# Patient Record
Sex: MALE | Race: WHITE | Employment: OTHER | URBAN - METROPOLITAN AREA
[De-identification: names, ages, dates, MRNs, and addresses within clinical notes are randomized per-mention and may not be internally consistent; named-entity substitution may affect disease eponyms.]

---

## 2023-11-09 ENCOUNTER — ANESTHESIA (OUTPATIENT)
Facility: HOSPITAL | Age: 73
End: 2023-11-09

## 2023-11-09 ENCOUNTER — ANESTHESIA EVENT (OUTPATIENT)
Facility: HOSPITAL | Age: 73
End: 2023-11-09

## 2023-11-09 ENCOUNTER — HOSPITAL ENCOUNTER (OUTPATIENT)
Facility: HOSPITAL | Age: 73
Setting detail: OUTPATIENT SURGERY
Discharge: HOME | End: 2023-11-09
Attending: INTERNAL MEDICINE | Admitting: INTERNAL MEDICINE
Payer: MEDICARE

## 2023-11-09 VITALS
TEMPERATURE: 97.8 F | OXYGEN SATURATION: 98 % | HEIGHT: 69 IN | HEART RATE: 56 BPM | WEIGHT: 217.59 LBS | DIASTOLIC BLOOD PRESSURE: 85 MMHG | RESPIRATION RATE: 20 BRPM | SYSTOLIC BLOOD PRESSURE: 118 MMHG | BODY MASS INDEX: 32.23 KG/M2

## 2023-11-09 PROCEDURE — 7100000011 HC PHASE II RECOVERY - ADDTL 15 MIN: Performed by: INTERNAL MEDICINE

## 2023-11-09 PROCEDURE — 6360000002 HC RX W HCPCS: Performed by: NURSE ANESTHETIST, CERTIFIED REGISTERED

## 2023-11-09 PROCEDURE — 3700000001 HC ADD 15 MINUTES (ANESTHESIA): Performed by: INTERNAL MEDICINE

## 2023-11-09 PROCEDURE — 88305 TISSUE EXAM BY PATHOLOGIST: CPT

## 2023-11-09 PROCEDURE — 7100000010 HC PHASE II RECOVERY - FIRST 15 MIN: Performed by: INTERNAL MEDICINE

## 2023-11-09 PROCEDURE — 2580000003 HC RX 258: Performed by: INTERNAL MEDICINE

## 2023-11-09 PROCEDURE — 3600007512: Performed by: INTERNAL MEDICINE

## 2023-11-09 PROCEDURE — 88342 IMHCHEM/IMCYTCHM 1ST ANTB: CPT

## 2023-11-09 PROCEDURE — 3700000000 HC ANESTHESIA ATTENDED CARE: Performed by: INTERNAL MEDICINE

## 2023-11-09 PROCEDURE — 3600007502: Performed by: INTERNAL MEDICINE

## 2023-11-09 PROCEDURE — 2500000003 HC RX 250 WO HCPCS: Performed by: NURSE ANESTHETIST, CERTIFIED REGISTERED

## 2023-11-09 RX ORDER — SODIUM CHLORIDE 9 MG/ML
25 INJECTION, SOLUTION INTRAVENOUS PRN
Status: DISCONTINUED | OUTPATIENT
Start: 2023-11-09 | End: 2023-11-09 | Stop reason: HOSPADM

## 2023-11-09 RX ORDER — FLUTICASONE PROPIONATE 50 MCG
SPRAY, SUSPENSION (ML) NASAL
COMMUNITY
Start: 2023-05-30 | End: 2024-05-29

## 2023-11-09 RX ORDER — SODIUM CHLORIDE 0.9 % (FLUSH) 0.9 %
5-40 SYRINGE (ML) INJECTION EVERY 12 HOURS SCHEDULED
Status: DISCONTINUED | OUTPATIENT
Start: 2023-11-09 | End: 2023-11-09 | Stop reason: HOSPADM

## 2023-11-09 RX ORDER — MONTELUKAST SODIUM 10 MG/1
10 TABLET ORAL EVERY EVENING
COMMUNITY
Start: 2021-09-29

## 2023-11-09 RX ORDER — LEVOTHYROXINE SODIUM 13 UG/1
CAPSULE ORAL
COMMUNITY
Start: 2023-02-03 | End: 2024-02-02

## 2023-11-09 RX ORDER — PROPOFOL 10 MG/ML
INJECTION, EMULSION INTRAVENOUS PRN
Status: DISCONTINUED | OUTPATIENT
Start: 2023-11-09 | End: 2023-11-09 | Stop reason: SDUPTHER

## 2023-11-09 RX ORDER — OMEPRAZOLE 40 MG/1
40 CAPSULE, DELAYED RELEASE ORAL DAILY
COMMUNITY

## 2023-11-09 RX ORDER — LIDOCAINE HYDROCHLORIDE 20 MG/ML
INJECTION, SOLUTION EPIDURAL; INFILTRATION; INTRACAUDAL; PERINEURAL PRN
Status: DISCONTINUED | OUTPATIENT
Start: 2023-11-09 | End: 2023-11-09 | Stop reason: SDUPTHER

## 2023-11-09 RX ORDER — SODIUM CHLORIDE 0.9 % (FLUSH) 0.9 %
5-40 SYRINGE (ML) INJECTION PRN
Status: DISCONTINUED | OUTPATIENT
Start: 2023-11-09 | End: 2023-11-09 | Stop reason: HOSPADM

## 2023-11-09 RX ORDER — FAMOTIDINE 40 MG/1
1 TABLET, FILM COATED ORAL NIGHTLY PRN
COMMUNITY
Start: 2023-02-03 | End: 2024-02-03

## 2023-11-09 RX ORDER — SIMVASTATIN 20 MG
1 TABLET ORAL NIGHTLY
Qty: 30 TABLET | Refills: 11 | COMMUNITY
Start: 2023-02-03 | End: 2024-02-03

## 2023-11-09 RX ADMIN — PROPOFOL 100 MG: 10 INJECTION, EMULSION INTRAVENOUS at 13:09

## 2023-11-09 RX ADMIN — LIDOCAINE HYDROCHLORIDE 100 MG: 20 INJECTION, SOLUTION EPIDURAL; INFILTRATION; INTRACAUDAL at 13:09

## 2023-11-09 RX ADMIN — PROPOFOL 100 MG: 10 INJECTION, EMULSION INTRAVENOUS at 13:15

## 2023-11-09 RX ADMIN — SODIUM CHLORIDE: 9 INJECTION, SOLUTION INTRAVENOUS at 13:02

## 2023-11-09 ASSESSMENT — PAIN - FUNCTIONAL ASSESSMENT: PAIN_FUNCTIONAL_ASSESSMENT: 0-10

## 2023-11-09 ASSESSMENT — PAIN SCALES - GENERAL
PAINLEVEL_OUTOF10: 0

## 2023-11-09 NOTE — PROCEDURES
2 [facility] Regan Hopkins MD  [phone]           2023                EGD Operative Report  Jada Osborn  :  1950  Jim Bowden Medical Record Number:  [id number]      Indication: GERD    : Bety Davis MD    Referring Provider:  Alena Patel MD    Anesthesia/Sedation:  MAC    Airway assessment: No airway problems anticipated    Pre-Procedural Exam:      Airway: clear, no airway problems anticipated  Heart: RRR, without gallops or rubs  Lungs: clear bilaterally without wheezes, crackles, or rhonchi  Abdomen: soft, nontender, nondistended, bowel sounds present  Mental Status: awake, alert and oriented to person, place and time       Procedure Details     After infomed consent was obtained for the procedure, with all risks and benefits of procedure explained the patient was taken to the endoscopy suite and placed in the left lateral decubitus position. Following sequential administration of sedation as per above, the endoscope was inserted into the mouth and advanced under direct vision to second portion of the duodenum. A careful inspection was made as the gastroscope was withdrawn, including a retroflexed view of the proximal stomach; findings and interventions are described below. Findings:   Esophagus: Z line irregular at 40cm. LA Grade A esophagitis. Notably, no strictures or narrowings that would be causing dysphagia. Stomach: Antrum predominant gastritis. Biopsies taken to rule out H. Pylori. Duodenum: Normal examined duodenum. Therapies:  None    Specimens:     1) Gastric biopsies  2) GE junction biopsies           Complications:   None; patient tolerated the procedure well. EBL:  Minimal           Impression:        -LA Grade A esophagitis. Biopsied.  -Gastritis. Biopsied.  -Otherwise normal exam to the second part of the duodenum.     Recommendations:    -Continue Protonix  -Follow up pathology.  -Discharge home.  -Consider pH impedance testing OFF PPI if symptoms continue and unrevealing.         Sean Villatoro MD

## 2023-11-09 NOTE — DISCHARGE INSTRUCTIONS
[address] [address] Clem Campbell MD  [phone]         [address]  [id number]  1950    DISCOMFORT:  Sore throat- throat lozenges or warm salt water gargle  Redness at IV site- apply warm compress to area; if redness or soreness persist- contact your physician  Gaseous discomfort- walking, belching will help relieve any discomfort  You may not operate a vehicle for 12 hours  You may not engage in an occupation involving machinery or appliances for the  rest of today  You may not drink alcoholic beverages for at least 12 hours  Avoid making any critical decisions for at least 24 hours    DIET:   You may resume your normal diet, but some patients find that heavy or large meals may lead to indigestion or vomiting. I suggest a light meal as first food intake. ACTIVITY:  You may resume your normal daily activities. It is recommended that you spend the remainder of the day resting - avoid any strenuous activity.     CALL M.D. IF ANY SIGN OF:   Increasing pain, nausea, vomiting  Abdominal distension (swelling)  Significant bleeding (oral or rectal)  Fever   Pain in chest area  Shortness of breath    Additional Instructions:  Call Dr. Grier Cluster office if you have any questions or problems at [phone]  You should receive the biopsy results by phone or mail within 3 weeks, if not, call my office for the results

## 2023-11-09 NOTE — PROGRESS NOTES
Retha Aschoff  1950  [id number]    Situation:  Verbal report received from: DELFINO Jones   Procedure: Procedure(s):  EGD DIAGNOSTIC ONLY  EGD BIOPSY    Background:    Preoperative diagnosis: Dysphagia, unspecified type [R13.10]  Postoperative diagnosis: * No post-op diagnosis entered *    :  Dr. Claudia Canseco   Assistant(s): Circulator: Stephen Melendrez RN  Endoscopy Technician: Saeed Salinas    Specimens:   ID Type Source Tests Collected by Time Destination   A : GASTRIC BIOPSIES Tissue Gastric SURGICAL PATHOLOGY Della Werner MD 11/9/2023 1313    B : [address] [place] [address] Tissue Gastric SURGICAL PATHOLOGY Della Werner MD 11/9/2023 1316      H. Pylori    no    Assessment:  Intra-procedure medications cg    Anesthesia gave intra-procedure sedation and medications, see anesthesia flow sheet   yes    Intravenous fluids: NS@ KVO     Vital signs stable   yes    Abdominal assessment: round and soft   yes    Recommendation:  Discharge patient per MD order  yes.   Return to floor  outpatient  Family or Friend   spouse   Permission to share finding with family or friend   yes

## 2023-11-09 NOTE — PERIOP NOTE
Initial RN admission and assessment performed and documented in Endoscopy navigator. Patient evaluated by anesthesia in pre-procedure holding. All procedural vital signs, airway assessment, and level of consciousness information monitored and recorded by anesthesia staff on the anesthesia record. Report received from SANTO Johnson post procedure. Patient transported to recovery area by RN. Report given to post procedure RNMack was pre-cleaned at bedside immediately following procedure by Delmy

## 2023-11-09 NOTE — PROGRESS NOTES
Endoscopy discharge instructions have been reviewed and given to patient. The patient verbalized understanding and acceptance of instructions. Dr. Precious Bliss  discussed with spouse procedure findings and next steps.

## 2023-11-10 NOTE — ANESTHESIA POSTPROCEDURE EVALUATION
Department of Anesthesiology  Postprocedure Note    Patient: Sheryl Kwan  MRN: [id number]  YOB: 1950  Date of evaluation: 11/9/2023      Procedure Summary     Date: 11/09/23 Room / Location: [place] [address] / [facility] ENDOSCOPY    Anesthesia Start: 1305 Anesthesia Stop: 1320    Procedures:       EGD DIAGNOSTIC ONLY (Upper GI Region)      EGD BIOPSY (Upper GI Region) Diagnosis:       Dysphagia, unspecified type      (Dysphagia, unspecified type [R13.10])    Surgeons: Amrita Dumas MD Responsible Provider: Hamida Chapman MD    Anesthesia Type: MAC ASA Status: 2          Anesthesia Type: No value filed. Anna Phase I: Anna Score: 10    Anna Phase II: Anna Score: 10      Anesthesia Post Evaluation    Patient location during evaluation: PACU  Level of consciousness: awake and alert  Pain score: 0  Airway patency: patent  Nausea & Vomiting: no vomiting and no nausea  Complications: no  Cardiovascular status: hemodynamically stable  Respiratory status: room air  Hydration status: stable  There was medical reason for not using a multimodal analgesia pain management approach.